# Patient Record
Sex: FEMALE | Race: WHITE
[De-identification: names, ages, dates, MRNs, and addresses within clinical notes are randomized per-mention and may not be internally consistent; named-entity substitution may affect disease eponyms.]

---

## 2021-10-02 ENCOUNTER — HOSPITAL ENCOUNTER (EMERGENCY)
Dept: HOSPITAL 54 - ER | Age: 21
Discharge: HOME | End: 2021-10-02
Payer: SELF-PAY

## 2021-10-02 VITALS — HEIGHT: 70 IN | WEIGHT: 220 LBS | BODY MASS INDEX: 31.5 KG/M2

## 2021-10-02 VITALS — SYSTOLIC BLOOD PRESSURE: 135 MMHG | DIASTOLIC BLOOD PRESSURE: 81 MMHG
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
## 2021-10-02 DIAGNOSIS — S01.511A: ICD-10-CM

## 2021-10-02 DIAGNOSIS — F17.200: ICD-10-CM

## 2021-10-02 DIAGNOSIS — S09.8XXA: ICD-10-CM

## 2021-10-02 DIAGNOSIS — S02.5XXA: Primary | ICD-10-CM
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
## 2021-10-02 DIAGNOSIS — S03.2XXA: ICD-10-CM

## 2021-10-02 DIAGNOSIS — S80.01XA: ICD-10-CM
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
## 2021-10-02 PROCEDURE — 70160 X-RAY EXAM OF NASAL BONES: CPT

## 2021-10-02 PROCEDURE — 90471 IMMUNIZATION ADMIN: CPT

## 2021-10-02 PROCEDURE — 99283 EMERGENCY DEPT VISIT LOW MDM: CPT

## 2021-10-02 PROCEDURE — 90715 TDAP VACCINE 7 YRS/> IM: CPT

## 2021-10-02 PROCEDURE — A6403 STERILE GAUZE>16 <= 48 SQ IN: HCPCS

## 2021-10-02 RX ADMIN — IBUPROFEN ONE MG: 600 TABLET, FILM COATED ORAL at 03:05

## 2021-10-02 RX ADMIN — IBUPROFEN ONE MG: 600 TABLET, FILM COATED ORAL at 03:13
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
## 2022-11-06 ENCOUNTER — HOSPITAL ENCOUNTER (EMERGENCY)
Dept: HOSPITAL 12 - ER | Age: 22
Discharge: LEFT BEFORE DISCHARGE | End: 2022-11-06
Payer: SELF-PAY

## 2022-11-06 ENCOUNTER — HOSPITAL ENCOUNTER (EMERGENCY)
Dept: HOSPITAL 12 - ER | Age: 22
Discharge: HOME | End: 2022-11-06
Payer: COMMERCIAL

## 2022-11-06 VITALS — SYSTOLIC BLOOD PRESSURE: 109 MMHG | DIASTOLIC BLOOD PRESSURE: 67 MMHG

## 2022-11-06 VITALS — HEIGHT: 70 IN | WEIGHT: 240 LBS | BODY MASS INDEX: 34.36 KG/M2

## 2022-11-06 DIAGNOSIS — Z53.21: Primary | ICD-10-CM

## 2022-11-06 DIAGNOSIS — E06.3: ICD-10-CM

## 2022-11-06 DIAGNOSIS — Z20.822: ICD-10-CM

## 2022-11-06 DIAGNOSIS — R43.8: ICD-10-CM

## 2022-11-06 DIAGNOSIS — Z72.0: ICD-10-CM

## 2022-11-06 DIAGNOSIS — J11.1: Primary | ICD-10-CM

## 2022-11-06 PROCEDURE — A4663 DIALYSIS BLOOD PRESSURE CUFF: HCPCS

## 2022-11-06 NOTE — NUR
Patient discharged to home in stable condition. A/O x4. NAD noted. Ambulatory 
with a steady gait. All belongings with patient. Written and verbal after care 
instructions given. 

Patient verbalizes understanding of instructions. Stressed follow up or return 
to ER for worsening s/s.

## 2023-03-14 ENCOUNTER — HOSPITAL ENCOUNTER (EMERGENCY)
Dept: HOSPITAL 54 - ER | Age: 23
LOS: 1 days | Discharge: HOME | End: 2023-03-15
Payer: COMMERCIAL

## 2023-03-14 VITALS — HEIGHT: 70 IN | BODY MASS INDEX: 35.79 KG/M2 | WEIGHT: 250 LBS

## 2023-03-14 DIAGNOSIS — Z20.822: ICD-10-CM

## 2023-03-14 DIAGNOSIS — E03.9: ICD-10-CM

## 2023-03-14 DIAGNOSIS — R45.851: Primary | ICD-10-CM

## 2023-03-14 DIAGNOSIS — F17.200: ICD-10-CM

## 2023-03-14 DIAGNOSIS — F32.A: ICD-10-CM

## 2023-03-14 DIAGNOSIS — Z79.899: ICD-10-CM

## 2023-03-14 PROCEDURE — 85025 COMPLETE CBC W/AUTO DIFF WBC: CPT

## 2023-03-14 PROCEDURE — 80320 DRUG SCREEN QUANTALCOHOLS: CPT

## 2023-03-14 PROCEDURE — G0480 DRUG TEST DEF 1-7 CLASSES: HCPCS

## 2023-03-14 PROCEDURE — 80143 DRUG ASSAY ACETAMINOPHEN: CPT

## 2023-03-14 PROCEDURE — 81003 URINALYSIS AUTO W/O SCOPE: CPT

## 2023-03-14 PROCEDURE — 80048 BASIC METABOLIC PNL TOTAL CA: CPT

## 2023-03-14 PROCEDURE — 99285 EMERGENCY DEPT VISIT HI MDM: CPT

## 2023-03-14 PROCEDURE — 80076 HEPATIC FUNCTION PANEL: CPT

## 2023-03-14 PROCEDURE — C9803 HOPD COVID-19 SPEC COLLECT: HCPCS

## 2023-03-14 PROCEDURE — 84703 CHORIONIC GONADOTROPIN ASSAY: CPT

## 2023-03-14 PROCEDURE — 36415 COLL VENOUS BLD VENIPUNCTURE: CPT

## 2023-03-14 PROCEDURE — 80307 DRUG TEST PRSMV CHEM ANLYZR: CPT

## 2023-03-14 PROCEDURE — 87426 SARSCOV CORONAVIRUS AG IA: CPT

## 2023-03-15 VITALS — SYSTOLIC BLOOD PRESSURE: 128 MMHG | DIASTOLIC BLOOD PRESSURE: 79 MMHG

## 2023-03-15 LAB
ALBUMIN SERPL BCP-MCNC: 3.9 G/DL (ref 3.4–5)
ALP SERPL-CCNC: 55 U/L (ref 46–116)
ALT SERPL W P-5'-P-CCNC: 31 U/L (ref 12–78)
APAP SERPL-MCNC: 0 UG/ML (ref 10–30)
AST SERPL W P-5'-P-CCNC: 21 U/L (ref 15–37)
BASOPHILS # BLD AUTO: 0.1 K/UL (ref 0–0.2)
BASOPHILS NFR BLD AUTO: 0.6 % (ref 0–2)
BILIRUB DIRECT SERPL-MCNC: 0.1 MG/DL (ref 0–0.2)
BILIRUB SERPL-MCNC: 0.2 MG/DL (ref 0.2–1)
BILIRUB UR QL STRIP: NEGATIVE
BUN SERPL-MCNC: 16 MG/DL (ref 7–18)
CALCIUM SERPL-MCNC: 9.2 MG/DL (ref 8.5–10.1)
CHLORIDE SERPL-SCNC: 100 MMOL/L (ref 98–107)
CO2 SERPL-SCNC: 26 MMOL/L (ref 21–32)
COLOR UR: YELLOW
CREAT SERPL-MCNC: 0.8 MG/DL (ref 0.6–1.3)
EOSINOPHIL NFR BLD AUTO: 2.5 % (ref 0–6)
ETHANOL SERPL-MCNC: < 3 MG/DL (ref 0–0)
GLUCOSE SERPL-MCNC: 101 MG/DL (ref 74–106)
GLUCOSE UR STRIP-MCNC: NEGATIVE MG/DL
HCT VFR BLD AUTO: 40 % (ref 33–45)
HGB BLD-MCNC: 12.9 G/DL (ref 11.5–14.8)
LEUKOCYTE ESTERASE UR QL STRIP: NEGATIVE
LYMPHOCYTES NFR BLD AUTO: 27.7 % (ref 20–44)
LYMPHOCYTES NFR BLD AUTO: 3 K/UL (ref 0.8–4.8)
MCHC RBC AUTO-ENTMCNC: 33 G/DL (ref 31–36)
MCV RBC AUTO: 79 FL (ref 82–100)
MONOCYTES NFR BLD AUTO: 0.6 K/UL (ref 0.1–1.3)
MONOCYTES NFR BLD AUTO: 6 % (ref 2–12)
NEUTROPHILS # BLD AUTO: 6.8 K/UL (ref 1.8–8.9)
NEUTROPHILS NFR BLD AUTO: 63.2 % (ref 43–81)
NITRITE UR QL STRIP: NEGATIVE
PH UR STRIP: 7.5 [PH] (ref 5–8)
PLATELET # BLD AUTO: 378 K/UL (ref 150–450)
POTASSIUM SERPL-SCNC: 3.8 MMOL/L (ref 3.5–5.1)
PROT SERPL-MCNC: 8 G/DL (ref 6.4–8.2)
PROT UR QL STRIP: NEGATIVE MG/DL
RBC # BLD AUTO: 5.06 MIL/UL (ref 4–5.2)
SODIUM SERPL-SCNC: 135 MMOL/L (ref 136–145)
UROBILINOGEN UR STRIP-MCNC: 0.2 EU/DL
WBC NRBC COR # BLD AUTO: 10.8 K/UL (ref 4.3–11)

## 2023-03-15 NOTE — NUR
PATIENT IS A, OX4 AND VERBALLY RESPONSIVE. REPORTED NO LONGER SI/HI AND 
REPORTED WILLING TO LEAVE THE HOSPITAL. PATIENT IS AMBULATORY WITH STEADY 
GAITTS. PO INTAKE TOLERATED WELL. MD MADE AWARE. STABLE FOR DISCHARGE.

## 2023-03-15 NOTE — NUR
Patient discharged to home in stable condition. Written and verbal after care 
instructions given. Patient verbalizes understanding of instruction. patient 
was provided with necessary resources.